# Patient Record
Sex: FEMALE | Race: WHITE | NOT HISPANIC OR LATINO | Employment: UNEMPLOYED | URBAN - METROPOLITAN AREA
[De-identification: names, ages, dates, MRNs, and addresses within clinical notes are randomized per-mention and may not be internally consistent; named-entity substitution may affect disease eponyms.]

---

## 2017-01-16 ENCOUNTER — HOSPITAL ENCOUNTER (OUTPATIENT)
Dept: OBGYN | Facility: HOSPITAL | Age: 32
Discharge: HOME | End: 2017-01-16
Attending: ADVANCED PRACTICE MIDWIFE | Admitting: OBSTETRICS & GYNECOLOGY

## 2017-01-16 ASSESSMENT — MIFFLIN-ST. JEOR: SCORE: 1468.81

## 2017-01-23 ENCOUNTER — AMBULATORY - HEALTHEAST (OUTPATIENT)
Dept: HEALTH INFORMATION MANAGEMENT | Facility: CLINIC | Age: 32
End: 2017-01-23

## 2017-01-27 ENCOUNTER — COMMUNICATION - HEALTHEAST (OUTPATIENT)
Dept: OBGYN | Facility: HOSPITAL | Age: 32
End: 2017-01-27

## 2017-01-27 ENCOUNTER — ANESTHESIA - HEALTHEAST (OUTPATIENT)
Dept: OBGYN | Facility: HOSPITAL | Age: 32
End: 2017-01-27

## 2017-01-31 ENCOUNTER — COMMUNICATION - HEALTHEAST (OUTPATIENT)
Dept: OBGYN | Facility: HOSPITAL | Age: 32
End: 2017-01-31

## 2018-08-02 LAB
ANTIBODY_EXT (HISTORICAL CONVERSION): NORMAL
HBSAG_EXT (HISTORICAL CONVERSION): NORMAL
HGB_EXT (HISTORICAL CONVERSION): 13.7
HIV_EXT: NORMAL
PLT_EXT - HISTORICAL: 391
RPR - HISTORICAL: NORMAL
RUBELLA_EXT (HISTORICAL CONVERSION): NORMAL

## 2018-12-06 ENCOUNTER — HOSPITAL ENCOUNTER (OUTPATIENT)
Dept: OBGYN | Facility: HOSPITAL | Age: 33
Discharge: HOME | End: 2018-12-06
Attending: ADVANCED PRACTICE MIDWIFE | Admitting: ADVANCED PRACTICE MIDWIFE

## 2018-12-06 LAB — FETAL RBC % LFV: 0 %

## 2018-12-06 ASSESSMENT — MIFFLIN-ST. JEOR: SCORE: 1388.07

## 2019-02-07 LAB — GBS_EXT (HISTORICAL CONVERSION): NEGATIVE

## 2019-02-14 ENCOUNTER — HOSPITAL ENCOUNTER (OUTPATIENT)
Dept: OBGYN | Facility: HOSPITAL | Age: 34
Discharge: HOME | End: 2019-02-15
Attending: ADVANCED PRACTICE MIDWIFE | Admitting: ADVANCED PRACTICE MIDWIFE

## 2019-02-14 ASSESSMENT — MIFFLIN-ST. JEOR: SCORE: 1465.19

## 2019-02-18 ENCOUNTER — ANESTHESIA - HEALTHEAST (OUTPATIENT)
Dept: OBGYN | Facility: HOSPITAL | Age: 34
End: 2019-02-18

## 2019-02-18 ASSESSMENT — MIFFLIN-ST. JEOR: SCORE: 1465.19

## 2019-02-19 ENCOUNTER — SURGERY - HEALTHEAST (OUTPATIENT)
Dept: SURGERY | Facility: HOSPITAL | Age: 34
End: 2019-02-19

## 2019-02-19 ENCOUNTER — ANESTHESIA - HEALTHEAST (OUTPATIENT)
Dept: SURGERY | Facility: HOSPITAL | Age: 34
End: 2019-02-19

## 2020-08-14 ENCOUNTER — HOSPITAL ENCOUNTER (EMERGENCY)
Facility: CLINIC | Age: 35
Discharge: HOME | End: 2020-08-14
Attending: EMERGENCY MEDICINE | Admitting: EMERGENCY MEDICINE
Payer: COMMERCIAL

## 2020-08-14 VITALS
RESPIRATION RATE: 16 BRPM | TEMPERATURE: 98.2 F | OXYGEN SATURATION: 99 % | WEIGHT: 137 LBS | SYSTOLIC BLOOD PRESSURE: 124 MMHG | HEART RATE: 83 BPM | DIASTOLIC BLOOD PRESSURE: 77 MMHG

## 2020-08-14 DIAGNOSIS — R42 DIZZY SPELLS: ICD-10-CM

## 2020-08-14 DIAGNOSIS — R20.2 PARESTHESIA: ICD-10-CM

## 2020-08-14 LAB
ALBUMIN SERPL-MCNC: 4 G/DL (ref 3.4–5)
ALBUMIN UR-MCNC: NEGATIVE MG/DL
ALP SERPL-CCNC: 70 U/L (ref 40–150)
ALT SERPL W P-5'-P-CCNC: 19 U/L (ref 0–50)
ANION GAP SERPL CALCULATED.3IONS-SCNC: 6 MMOL/L (ref 3–14)
APPEARANCE UR: CLEAR
AST SERPL W P-5'-P-CCNC: 15 U/L (ref 0–45)
BACTERIA #/AREA URNS HPF: ABNORMAL /HPF
BASOPHILS # BLD AUTO: 0.1 10E9/L (ref 0–0.2)
BASOPHILS NFR BLD AUTO: 0.9 %
BILIRUB SERPL-MCNC: 0.5 MG/DL (ref 0.2–1.3)
BILIRUB UR QL STRIP: NEGATIVE
BUN SERPL-MCNC: 8 MG/DL (ref 7–30)
CALCIUM SERPL-MCNC: 8.8 MG/DL (ref 8.5–10.1)
CHLORIDE SERPL-SCNC: 114 MMOL/L (ref 94–109)
CO2 SERPL-SCNC: 21 MMOL/L (ref 20–32)
COLOR UR AUTO: COLORLESS
CREAT SERPL-MCNC: 0.55 MG/DL (ref 0.52–1.04)
DIFFERENTIAL METHOD BLD: ABNORMAL
EOSINOPHIL # BLD AUTO: 0.1 10E9/L (ref 0–0.7)
EOSINOPHIL NFR BLD AUTO: 0.8 %
ERYTHROCYTE [DISTWIDTH] IN BLOOD BY AUTOMATED COUNT: 13.4 % (ref 10–15)
GFR SERPL CREATININE-BSD FRML MDRD: >90 ML/MIN/{1.73_M2}
GLUCOSE SERPL-MCNC: 115 MG/DL (ref 70–99)
GLUCOSE UR STRIP-MCNC: NEGATIVE MG/DL
HCT VFR BLD AUTO: 37.6 % (ref 35–47)
HGB BLD-MCNC: 12.1 G/DL (ref 11.7–15.7)
HGB UR QL STRIP: NEGATIVE
IMM GRANULOCYTES # BLD: 0 10E9/L (ref 0–0.4)
IMM GRANULOCYTES NFR BLD: 0.3 %
KETONES UR STRIP-MCNC: NEGATIVE MG/DL
LEUKOCYTE ESTERASE UR QL STRIP: NEGATIVE
LYMPHOCYTES # BLD AUTO: 2 10E9/L (ref 0.8–5.3)
LYMPHOCYTES NFR BLD AUTO: 18.2 %
MCH RBC QN AUTO: 28.8 PG (ref 26.5–33)
MCHC RBC AUTO-ENTMCNC: 32.2 G/DL (ref 31.5–36.5)
MCV RBC AUTO: 90 FL (ref 78–100)
MONOCYTES # BLD AUTO: 0.7 10E9/L (ref 0–1.3)
MONOCYTES NFR BLD AUTO: 6.3 %
NEUTROPHILS # BLD AUTO: 8.1 10E9/L (ref 1.6–8.3)
NEUTROPHILS NFR BLD AUTO: 73.5 %
NITRATE UR QL: NEGATIVE
NRBC # BLD AUTO: 0 10*3/UL
NRBC BLD AUTO-RTO: 0 /100
PH UR STRIP: 6 PH (ref 5–7)
PLATELET # BLD AUTO: 354 10E9/L (ref 150–450)
POTASSIUM SERPL-SCNC: 3.4 MMOL/L (ref 3.4–5.3)
PROT SERPL-MCNC: 7.4 G/DL (ref 6.8–8.8)
RBC # BLD AUTO: 4.2 10E12/L (ref 3.8–5.2)
RBC #/AREA URNS AUTO: <1 /HPF (ref 0–2)
SODIUM SERPL-SCNC: 141 MMOL/L (ref 133–144)
SOURCE: ABNORMAL
SP GR UR STRIP: 1 (ref 1–1.03)
UROBILINOGEN UR STRIP-MCNC: 0 MG/DL (ref 0–2)
WBC # BLD AUTO: 11.1 10E9/L (ref 4–11)
WBC #/AREA URNS AUTO: <1 /HPF (ref 0–5)

## 2020-08-14 PROCEDURE — 99285 EMERGENCY DEPT VISIT HI MDM: CPT | Performed by: EMERGENCY MEDICINE

## 2020-08-14 PROCEDURE — 25000128 H RX IP 250 OP 636: Performed by: EMERGENCY MEDICINE

## 2020-08-14 PROCEDURE — 93005 ELECTROCARDIOGRAM TRACING: CPT | Performed by: EMERGENCY MEDICINE

## 2020-08-14 PROCEDURE — 81001 URINALYSIS AUTO W/SCOPE: CPT | Performed by: EMERGENCY MEDICINE

## 2020-08-14 PROCEDURE — 93010 ELECTROCARDIOGRAM REPORT: CPT | Mod: Z6 | Performed by: EMERGENCY MEDICINE

## 2020-08-14 PROCEDURE — 99285 EMERGENCY DEPT VISIT HI MDM: CPT | Mod: 25 | Performed by: EMERGENCY MEDICINE

## 2020-08-14 PROCEDURE — 85025 COMPLETE CBC W/AUTO DIFF WBC: CPT | Performed by: EMERGENCY MEDICINE

## 2020-08-14 PROCEDURE — 96375 TX/PRO/DX INJ NEW DRUG ADDON: CPT | Performed by: EMERGENCY MEDICINE

## 2020-08-14 PROCEDURE — 96374 THER/PROPH/DIAG INJ IV PUSH: CPT | Performed by: EMERGENCY MEDICINE

## 2020-08-14 PROCEDURE — 80053 COMPREHEN METABOLIC PANEL: CPT | Performed by: EMERGENCY MEDICINE

## 2020-08-14 RX ORDER — KETOROLAC TROMETHAMINE 15 MG/ML
15 INJECTION, SOLUTION INTRAMUSCULAR; INTRAVENOUS ONCE
Status: COMPLETED | OUTPATIENT
Start: 2020-08-14 | End: 2020-08-14

## 2020-08-14 RX ORDER — LORAZEPAM 2 MG/ML
0.5 INJECTION INTRAMUSCULAR ONCE
Status: COMPLETED | OUTPATIENT
Start: 2020-08-14 | End: 2020-08-14

## 2020-08-14 RX ADMIN — KETOROLAC TROMETHAMINE 15 MG: 15 INJECTION, SOLUTION INTRAMUSCULAR; INTRAVENOUS at 21:49

## 2020-08-14 RX ADMIN — LORAZEPAM 0.5 MG: 2 INJECTION INTRAMUSCULAR; INTRAVENOUS at 21:52

## 2020-08-14 ASSESSMENT — ENCOUNTER SYMPTOMS
RHINORRHEA: 0
NERVOUS/ANXIOUS: 1
DYSURIA: 0
SORE THROAT: 0
SLEEP DISTURBANCE: 1
VOMITING: 0
NUMBNESS: 0
UNEXPECTED WEIGHT CHANGE: 0
APPETITE CHANGE: 0
TROUBLE SWALLOWING: 0
FEVER: 0
HEADACHES: 0
NAUSEA: 1
ABDOMINAL PAIN: 0
WEAKNESS: 0
CHILLS: 0
FLANK PAIN: 0
WHEEZING: 0
FATIGUE: 1
BACK PAIN: 0
SHORTNESS OF BREATH: 1
PALPITATIONS: 0
LIGHT-HEADEDNESS: 0
VOICE CHANGE: 0
PHOTOPHOBIA: 0
DYSPHORIC MOOD: 1
SINUS PRESSURE: 0

## 2020-08-14 NOTE — ED AVS SNAPSHOT
[facility]  [address] [address]  Phone:  [phone]  Fax:  [phone]                                    Elizabeth Kelly   MRN: [id number]    Department:  [facility]   Date of Visit:  8/14/2020           After Visit Summary Signature Page    I have received my discharge instructions, and my questions have been answered. I have discussed any challenges I see with this plan with the nurse or doctor.    ..........................................................................................................................................  Patient/Patient Representative Signature      ..........................................................................................................................................  Patient Representative Print Name and Relationship to Patient    ..................................................               ................................................  Date                                   Time    ..........................................................................................................................................  Reviewed by Signature/Title    ...................................................              ..............................................  Date                                               Time          22EPIC Rev 08/18

## 2020-08-15 ENCOUNTER — HOSPITAL ENCOUNTER (EMERGENCY)
Facility: CLINIC | Age: 35
Discharge: HOME | End: 2020-08-15
Attending: NURSE PRACTITIONER | Admitting: NURSE PRACTITIONER
Payer: COMMERCIAL

## 2020-08-15 VITALS
HEIGHT: 66 IN | SYSTOLIC BLOOD PRESSURE: 126 MMHG | HEART RATE: 69 BPM | BODY MASS INDEX: 22.02 KG/M2 | TEMPERATURE: 97.1 F | WEIGHT: 137 LBS | RESPIRATION RATE: 14 BRPM | OXYGEN SATURATION: 100 % | DIASTOLIC BLOOD PRESSURE: 64 MMHG

## 2020-08-15 DIAGNOSIS — R07.89 CHEST PAIN, NON-CARDIAC: ICD-10-CM

## 2020-08-15 DIAGNOSIS — F41.8 DEPRESSION WITH ANXIETY: ICD-10-CM

## 2020-08-15 DIAGNOSIS — R20.2 PARESTHESIAS: ICD-10-CM

## 2020-08-15 LAB
ANION GAP SERPL CALCULATED.3IONS-SCNC: 9 MMOL/L (ref 3–14)
BASOPHILS # BLD AUTO: 0.1 10E9/L (ref 0–0.2)
BASOPHILS NFR BLD AUTO: 1 %
BUN SERPL-MCNC: 7 MG/DL (ref 7–30)
CALCIUM SERPL-MCNC: 8.9 MG/DL (ref 8.5–10.1)
CHLORIDE SERPL-SCNC: 109 MMOL/L (ref 94–109)
CO2 SERPL-SCNC: 21 MMOL/L (ref 20–32)
CREAT SERPL-MCNC: 0.64 MG/DL (ref 0.52–1.04)
DIFFERENTIAL METHOD BLD: NORMAL
EOSINOPHIL # BLD AUTO: 0.1 10E9/L (ref 0–0.7)
EOSINOPHIL NFR BLD AUTO: 1.1 %
ERYTHROCYTE [DISTWIDTH] IN BLOOD BY AUTOMATED COUNT: 13.4 % (ref 10–15)
GFR SERPL CREATININE-BSD FRML MDRD: >90 ML/MIN/{1.73_M2}
GLUCOSE SERPL-MCNC: 95 MG/DL (ref 70–99)
HCT VFR BLD AUTO: 41.3 % (ref 35–47)
HGB BLD-MCNC: 13.3 G/DL (ref 11.7–15.7)
IMM GRANULOCYTES # BLD: 0 10E9/L (ref 0–0.4)
IMM GRANULOCYTES NFR BLD: 0.5 %
LYMPHOCYTES # BLD AUTO: 1.6 10E9/L (ref 0.8–5.3)
LYMPHOCYTES NFR BLD AUTO: 19.7 %
MCH RBC QN AUTO: 28.9 PG (ref 26.5–33)
MCHC RBC AUTO-ENTMCNC: 32.2 G/DL (ref 31.5–36.5)
MCV RBC AUTO: 90 FL (ref 78–100)
MONOCYTES # BLD AUTO: 0.6 10E9/L (ref 0–1.3)
MONOCYTES NFR BLD AUTO: 6.8 %
NEUTROPHILS # BLD AUTO: 5.8 10E9/L (ref 1.6–8.3)
NEUTROPHILS NFR BLD AUTO: 70.9 %
NRBC # BLD AUTO: 0 10*3/UL
NRBC BLD AUTO-RTO: 0 /100
PLATELET # BLD AUTO: 401 10E9/L (ref 150–450)
POTASSIUM SERPL-SCNC: 3.3 MMOL/L (ref 3.4–5.3)
RBC # BLD AUTO: 4.61 10E12/L (ref 3.8–5.2)
SODIUM SERPL-SCNC: 139 MMOL/L (ref 133–144)
TROPONIN I SERPL-MCNC: <0.015 UG/L (ref 0–0.04)
TSH SERPL DL<=0.005 MIU/L-ACNC: 1.69 MU/L (ref 0.4–4)
WBC # BLD AUTO: 8.1 10E9/L (ref 4–11)

## 2020-08-15 PROCEDURE — 96375 TX/PRO/DX INJ NEW DRUG ADDON: CPT | Performed by: NURSE PRACTITIONER

## 2020-08-15 PROCEDURE — 93010 ELECTROCARDIOGRAM REPORT: CPT | Mod: Z6 | Performed by: NURSE PRACTITIONER

## 2020-08-15 PROCEDURE — 96374 THER/PROPH/DIAG INJ IV PUSH: CPT | Performed by: NURSE PRACTITIONER

## 2020-08-15 PROCEDURE — 25000128 H RX IP 250 OP 636: Performed by: NURSE PRACTITIONER

## 2020-08-15 PROCEDURE — 85025 COMPLETE CBC W/AUTO DIFF WBC: CPT | Performed by: NURSE PRACTITIONER

## 2020-08-15 PROCEDURE — 99285 EMERGENCY DEPT VISIT HI MDM: CPT | Mod: 25 | Performed by: NURSE PRACTITIONER

## 2020-08-15 PROCEDURE — 80048 BASIC METABOLIC PNL TOTAL CA: CPT | Performed by: NURSE PRACTITIONER

## 2020-08-15 PROCEDURE — 93005 ELECTROCARDIOGRAM TRACING: CPT | Performed by: NURSE PRACTITIONER

## 2020-08-15 PROCEDURE — 84484 ASSAY OF TROPONIN QUANT: CPT | Performed by: NURSE PRACTITIONER

## 2020-08-15 PROCEDURE — 99285 EMERGENCY DEPT VISIT HI MDM: CPT | Performed by: NURSE PRACTITIONER

## 2020-08-15 PROCEDURE — 84443 ASSAY THYROID STIM HORMONE: CPT | Performed by: NURSE PRACTITIONER

## 2020-08-15 RX ORDER — LORAZEPAM 0.5 MG/1
0.5 TABLET ORAL EVERY 6 HOURS PRN
Qty: 6 TABLET | Refills: 0 | Status: SHIPPED | OUTPATIENT
Start: 2020-08-15

## 2020-08-15 RX ORDER — KETOROLAC TROMETHAMINE 15 MG/ML
15 INJECTION, SOLUTION INTRAMUSCULAR; INTRAVENOUS ONCE
Status: COMPLETED | OUTPATIENT
Start: 2020-08-15 | End: 2020-08-15

## 2020-08-15 RX ORDER — LORAZEPAM 2 MG/ML
1 INJECTION INTRAMUSCULAR ONCE
Status: COMPLETED | OUTPATIENT
Start: 2020-08-15 | End: 2020-08-15

## 2020-08-15 RX ADMIN — LORAZEPAM 1 MG: 2 INJECTION INTRAMUSCULAR; INTRAVENOUS at 12:19

## 2020-08-15 RX ADMIN — KETOROLAC TROMETHAMINE 15 MG: 15 INJECTION, SOLUTION INTRAMUSCULAR; INTRAVENOUS at 12:19

## 2020-08-15 ASSESSMENT — MIFFLIN-ST. JEOR: SCORE: 1343.18

## 2020-08-15 NOTE — ED TRIAGE NOTES
Pt presents with sudden onset of dizziness and left arm numbness.  Pt has normal speech.  No change in strength in extremities- equal and strong.

## 2020-08-15 NOTE — ED NOTES
Bed: ED10  Expected date:   Expected time:   Means of arrival:   Comments:  Ambulance  35 year old  Arm numbness

## 2020-08-15 NOTE — ED NOTES
"Pt was seen last night in ED.  Pt was discharged from ER at 2230.    Pt went to bed.   Pt woke up feeling\"weird\"   Pt sat on floor playing with child.   Pt then had numbness in mostly left arm but went into both hands and a wave of coldness.   Pt continues to have numbness in left arm  And tightness in chest.    Denies n/v  "

## 2020-08-15 NOTE — ED PROVIDER NOTES
"  History     Chief Complaint   Patient presents with     Chest Pain     seen yesterday, diagnosed with anxiety. can feel it coming on.      HPI  Elizabeth Kelly is a 35 year old female with past medical history including depression (previously stable on sertraline and self discontinued in January 2020) who presents emergency department with chest pain episodes.  Patient reports onset of symptoms was initially yesterday and patient was seen in the emergency department last evening for this.  Patient was discharged last evening.  Patient states that she woke up this morning and, subsequently, at 10:15 am started experiencing episodes.  Patient states that her episodes feel like her heart is beating fast or hard, chest tightness and associative symptoms of tunnel vision, left arm tingling and numbness of left hand, arm, extending to shoulder, left hand feels cold, feeling of \"brain feeling foggy and not normal\", and feels like has to take deep breaths and concentrate on breathing.  Pt reports it is \"very scary and it feels like I am going to die\".  These episodes last a couple of minutes  Thinks she has had 6 episodes since 10:15 am    Pt states was seen last night for this in the Ed; prior to yesterday no previous symptoms/episodes like this    Pt states she laid down and this is helpful and patient reports the medication given in the ED yesterday was helpful temporarily until 10:15 am  Pt denies known aggravating factors  Pt admits to current stress and marital problems but states the marital problems are not serious.  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  Patient also reports recent death of an in-law in the family and reports this should not make her have \"panic attacks \".  Patient states she exercises regularly and eats healthy and does everything right and expressing frustration with these episodes.    Hx of IBS " "and yesterday experienced diarrhea with nausea and typically under good control  No rashes or tick bites recently  No exposure to COVID that is known    Patient denies fever, aches, chills, sweats, ear pain, eye pain, throat pain, cough, wheezing, shortness of breath, abdominal pain, vomiting, hematochezia, hematemesis, bright red rectal bleeding, hematuria, dysuria, speech difficulty, left or right-sided body weakness, mental confusion, thoughts of harming self.    Allergies:  Allergies   Allergen Reactions     Penicillins Rash       Problem List:    There are no active problems to display for this patient.       Past Medical History:    No past medical history on file.    Past Surgical History:    No past surgical history on file.    Family History:    No family history on file.    Social History:  Marital Status:   [2]  Social History     Tobacco Use     Smoking status: Not on file   Substance Use Topics     Alcohol use: Not on file     Drug use: Not on file        Medications:    LORazepam (ATIVAN) 0.5 MG tablet  sertraline (ZOLOFT) 50 MG tablet      Review of Systems  As mentioned above in the history present illness. All other systems were reviewed and are negative.    Physical Exam   BP: 133/61  Pulse: 90  Heart Rate: 76  Temp: 97.1  F (36.2  C)  Resp: 22  Height: 167.6 cm (5' 6\")  Weight: 62.1 kg (137 lb)  SpO2: 100 %      Physical Exam  Vitals signs and nursing note reviewed.   Constitutional:       General: She is in acute distress (anxious).      Appearance: She is well-developed and normal weight. She is not ill-appearing, toxic-appearing or diaphoretic.   HENT:      Head: Normocephalic and atraumatic.      Right Ear: External ear normal.      Left Ear: External ear normal.   Eyes:      General:         Right eye: No discharge.         Left eye: No discharge.      Extraocular Movements: Extraocular movements intact.      Conjunctiva/sclera: Conjunctivae normal.      Pupils: Pupils are equal, " round, and reactive to light.   Neck:      Musculoskeletal: Normal range of motion and neck supple.      Thyroid: No thyromegaly.   Cardiovascular:      Rate and Rhythm: Normal rate and regular rhythm.      Pulses:           Radial pulses are 2+ on the right side and 2+ on the left side.      Heart sounds: Normal heart sounds. No murmur. No friction rub.   Pulmonary:      Effort: Pulmonary effort is normal. No respiratory distress.      Breath sounds: Normal breath sounds. No stridor. No wheezing, rhonchi or rales.   Chest:      Chest wall: No tenderness.   Abdominal:      General: Bowel sounds are normal. There is no distension.      Tenderness: There is no abdominal tenderness. There is no right CVA tenderness, left CVA tenderness, guarding or rebound.   Lymphadenopathy:      Cervical: No cervical adenopathy.   Skin:     General: Skin is warm and dry.      Capillary Refill: Capillary refill takes less than 2 seconds.      Coloration: Skin is not pale.      Findings: No erythema or rash.   Neurological:      Mental Status: She is alert.   Psychiatric:         Attention and Perception: Attention normal.         Mood and Affect: Mood is anxious.         Speech: Speech normal.         Behavior: Behavior is cooperative.         Thought Content: Thought content normal.         Judgment: Judgment normal.         ED Course        Procedures         EKG Interpretation:      EKG Number: 1  Interpreted by RAMSES Mcintosh CNP, Samuel Jimenes MD  Symptoms at time of EKG: paraesthesia, chest pain   Rhythm: Normal sinus   Rate: Normal  Axis: Normal  Ectopy: None  Conduction: Normal  ST Segments/ T Waves: non specific T wave flattening V4, V5 and V6  Q Waves: None  Clinical Impression: no acute changes, non specific EKG      Results for orders placed or performed during the hospital encounter of 08/15/20 (from the past 24 hour(s))   CBC with platelets differential   Result Value Ref Range    WBC 8.1 4.0 - 11.0 10e9/L     RBC Count 4.61 3.8 - 5.2 10e12/L    Hemoglobin 13.3 11.7 - 15.7 g/dL    Hematocrit 41.3 35.0 - 47.0 %    MCV 90 78 - 100 fl    MCH 28.9 26.5 - 33.0 pg    MCHC 32.2 31.5 - 36.5 g/dL    RDW 13.4 10.0 - 15.0 %    Platelet Count 401 150 - 450 10e9/L    Diff Method Automated Method     % Neutrophils 70.9 %    % Lymphocytes 19.7 %    % Monocytes 6.8 %    % Eosinophils 1.1 %    % Basophils 1.0 %    % Immature Granulocytes 0.5 %    Nucleated RBCs 0 0 /100    Absolute Neutrophil 5.8 1.6 - 8.3 10e9/L    Absolute Lymphocytes 1.6 0.8 - 5.3 10e9/L    Absolute Monocytes 0.6 0.0 - 1.3 10e9/L    Absolute Eosinophils 0.1 0.0 - 0.7 10e9/L    Absolute Basophils 0.1 0.0 - 0.2 10e9/L    Abs Immature Granulocytes 0.0 0 - 0.4 10e9/L    Absolute Nucleated RBC 0.0    Basic metabolic panel   Result Value Ref Range    Sodium 139 133 - 144 mmol/L    Potassium 3.3 (L) 3.4 - 5.3 mmol/L    Chloride 109 94 - 109 mmol/L    Carbon Dioxide 21 20 - 32 mmol/L    Anion Gap 9 3 - 14 mmol/L    Glucose 95 70 - 99 mg/dL    Urea Nitrogen 7 7 - 30 mg/dL    Creatinine 0.64 0.52 - 1.04 mg/dL    GFR Estimate >90 >60 mL/min/[1.73_m2]    GFR Estimate If Black >90 >60 mL/min/[1.73_m2]    Calcium 8.9 8.5 - 10.1 mg/dL   Troponin I   Result Value Ref Range    Troponin I ES <0.015 0.000 - 0.045 ug/L   TSH with free T4 reflex   Result Value Ref Range    TSH 1.69 0.40 - 4.00 mU/L       Medications   LORazepam (ATIVAN) injection 1 mg (1 mg Intravenous Given 8/15/20 1219)   ketorolac (TORADOL) injection 15 mg (15 mg Intravenous Given 8/15/20 1219)       Assessments & Plan (with Medical Decision Making)  Elizabeth Kelly is a 35 year old female with past medical history including depression (previously stable on sertraline and self discontinued in January 2020) who presents emergency department with chest pain episodes.  Patient seen approximately 12 hours previously in the emergency department for similar concerns.  Work-up included EKG and labs and treatment included Ativan  and Toradol.  Patient reporting previous treatments were helpful and temporarily resolving her symptoms.  On exam today patient appears anxious repeat EKG reveals nonspecific T wave flattening and V4 5 and no acute ischemic changes.  Troponin is negative for any acute ischemia.  TSH obtained and is normal no sign of thyroid storm.  CBC completed no sign of acute leukocytosis or acute anemia or blood loss.  Comprehensive metabolic panel completed and normal liver and renal function with no sign of electrolyte abnormality to be cause for current symptoms.  Discussed all of this with patient.  Patient given Toradol again and also given Ativan 1 mg.  Patient states it completely resolved her symptoms.  Spent 20 minutes of face-to-face time counseling patient and discussing the possibility of anxiety/depression for current symptoms and panic attacks versus a cardiology etiology.  Gave patient 6 tablets of Ativan to trial at home and also prescribed sertraline.  Advised patient that she needed a follow-up visit within 2 to 3 weeks to reassess.  Discussed that if she should develop suicidal thoughts or plans to return to the emergency room immediately for reevaluation.  Discussed the importance of counseling.  Patient verbalized understanding regarding all of the above and was discharged in stable condition.     I have reviewed the nursing notes.    I have reviewed the findings, diagnosis, plan and need for follow up with the patient.    Discharge Medication List as of 8/15/2020  2:23 PM      START taking these medications    Details   LORazepam (ATIVAN) 0.5 MG tablet Take 1 tablet (0.5 mg) by mouth every 6 hours as needed for anxiety, Disp-6 tablet,R-0, E-Prescribe             Final diagnoses:   Paresthesias   Chest pain, non-cardiac   Depression with anxiety       8/15/2020   [facility] EMERGENCY DEPARTMENT     Melody Sanchez APRN CNP  08/16/20 [id number]

## 2020-08-15 NOTE — ED AVS SNAPSHOT
[facility] Emergency Department  [address] [address]  Phone:  [phone]  Fax:  [phone]                                    Elizabeth Kelly   MRN: [id number]    Department:  [facility] Emergency Department   Date of Visit:  8/15/2020           After Visit Summary Signature Page    I have received my discharge instructions, and my questions have been answered. I have discussed any challenges I see with this plan with the nurse or doctor.    ..........................................................................................................................................  Patient/Patient Representative Signature      ..........................................................................................................................................  Patient Representative Print Name and Relationship to Patient    ..................................................               ................................................  Date                                   Time    ..........................................................................................................................................  Reviewed by Signature/Title    ...................................................              ..............................................  Date                                               Time          22EPIC Rev 08/18

## 2020-08-15 NOTE — DISCHARGE INSTRUCTIONS
Start the sertraline today.  I would recommend taking it either every morning or every night.  This takes approximately 1 to 4 weeks to take effect.  In the meantime if you should develop your symptoms again you may take 1 Ativan and if this helps decrease your symptoms then you can be reassured of it being more anxiety related than cardiac related.  Please schedule a follow-up visit in 7 to 10 to 14 days to recheck your response to sertraline.  Return to the emergency room if you should develop suicidal thoughts or plans or if you have worsening anxiety.

## 2020-08-15 NOTE — ED PROVIDER NOTES
"  History     Chief Complaint   Patient presents with     Dizziness     worse with position changes     Numbness     Left arm     HPI  Elizabeth Kelly is a 35 year old female with history of depression and irritable bowel syndrome presenting for evaluation of left arm numbness and near syncopal episode.  Patient reports that around 7 PM tonight she began to feel some slight numbness and tingling starting in her left hand and radiating upwards to involve her entire left arm and shoulder into her neck.  Patient denies any neck pain or other pain in her arm or chest.  Patient reports she initially ignored the symptoms and carried on with her normal activities including putting her daughter to bed.  After coming out of her daughter's room however, she started to feel lightheaded and \"got tunnel vision\".  Patient states she felt very scared and had to think about taking a breath and \"I felt like I was going to die\".  Patient lay down and put her feet up in the air and asked her  to call 911 because she felt so scared about her symptoms.  Patient reports her lightheadedness rapidly improved after laying down and she never lost consciousness.  She denies having any palpitations or chest pain but does report her heart \"felt cold\" for a brief period.  Patient also thinks she may have felt clammy for a brief period but this also has resolved.  In the ED patient reports only slight residual tingling in her left arm as well as stiffness in her neck.  Patient reports overall she is been feeling well recently.  Denies fever or chills.  Denies cough.  Denies any other infectious symptoms.  Patient does report she has been under a fair amount of stress and has been having difficulties with her marriage recently, especially over the past week.  Patient reports that her  continues to work and her kids are all healthy but she does have kids ranging from 18months to 9 years at home whom she has been taking care of.  Patient " "reports she has been off her sertraline for depression since the end of last year.  She reports her mood has been \"okay\" but she admits to feeling more down recently due to her relationship issues.  She reports some difficulty falling asleep and regularly wakes up feeling unrested.  She reports usually getting 6 to 7 hours of sleep at night.  Denies any previous history of panic attacks but does report family history of anxiety and panic disorder.  Patient denies any history of cardiac disease.  Reports she is routinely active without any cardiac symptoms or lightheadedness.  Patient reports she mowed the lawn today and felt fine.  She also reports that most days she does HIIT exercise routines and never has any symptoms of chest pain or lightheadedness and or difficulty breathing with these exercise intervals.    Allergies:  Allergies   Allergen Reactions     Penicillins Rash       Problem List:    There are no active problems to display for this patient.       Past Medical History:    No past medical history on file.    Past Surgical History:    No past surgical history on file.    Family History:    No family history on file.    Social History:  Marital Status:    Social History     Tobacco Use     Smoking status: Not on file   Substance Use Topics     Alcohol use: Not on file     Drug use: Not on file        Medications:    No current outpatient medications on file.        Review of Systems   Constitutional: Positive for fatigue (tonight). Negative for appetite change, chills, fever and unexpected weight change.   HENT: Negative for congestion, ear pain, mouth sores, postnasal drip, rhinorrhea, sinus pressure, sore throat, trouble swallowing and voice change.    Eyes: Negative for photophobia and visual disturbance.   Respiratory: Positive for shortness of breath (at time of event, resolved). Negative for wheezing.    Cardiovascular: Negative for chest pain, palpitations and leg swelling.   Gastrointestinal: " Positive for nausea (at time of event, resolved). Negative for abdominal pain and vomiting.   Genitourinary: Negative for dysuria, flank pain and vaginal bleeding (LMP 2.5 wk ago - has tubes tied).   Musculoskeletal: Negative for back pain.   Skin: Negative for rash.   Neurological: Negative for weakness, light-headedness, numbness and headaches.   Psychiatric/Behavioral: Positive for dysphoric mood and sleep disturbance. Negative for self-injury and suicidal ideas. The patient is nervous/anxious.    All other systems reviewed and are negative.      Physical Exam   BP: (!) 140/83  Pulse: 81  Heart Rate: 91  Temp: 98.2  F (36.8  C)  Resp: 16  Weight: 62.1 kg (137 lb)  SpO2: 100 %      Physical Exam  Vitals signs and nursing note reviewed.   Constitutional:       Appearance: Normal appearance. She is not ill-appearing or diaphoretic.   HENT:      Head: Normocephalic and atraumatic.      Nose: Nose normal.      Mouth/Throat:      Mouth: Mucous membranes are moist.   Eyes:      Extraocular Movements: Extraocular movements intact.      Conjunctiva/sclera: Conjunctivae normal.      Pupils: Pupils are equal, round, and reactive to light.   Neck:      Musculoskeletal: Normal range of motion and neck supple. No neck rigidity.      Comments: Some left-sided muscular tenderness of the paraspinal cervical musculature as well as trapezius muscle.  No change in her tingling sensation with movement of the neck  Cardiovascular:      Rate and Rhythm: Normal rate and regular rhythm.      Pulses: Normal pulses.      Heart sounds: Normal heart sounds. No murmur.   Pulmonary:      Effort: Pulmonary effort is normal.      Breath sounds: Normal breath sounds. No wheezing or rhonchi.   Abdominal:      General: Abdomen is flat.      Palpations: Abdomen is soft.      Tenderness: There is no abdominal tenderness.   Musculoskeletal: Normal range of motion.         General: No tenderness.   Skin:     General: Skin is warm and dry.       Capillary Refill: Capillary refill takes less than 2 seconds.   Neurological:      Mental Status: She is alert and oriented to person, place, and time.      GCS: GCS eye subscore is 4. GCS verbal subscore is 5. GCS motor subscore is 6.      Cranial Nerves: No cranial nerve deficit.      Sensory: Sensory deficit (Slight decrease sensation to the left arm involving all fingers and circumferentially up to the shoulder) present.      Motor: No weakness.      Coordination: Coordination normal.      Gait: Gait normal.   Psychiatric:         Attention and Perception: Attention normal.         Mood and Affect: Mood is anxious.         Speech: Speech normal.         Behavior: Behavior is cooperative.         Thought Content: Thought content does not include suicidal ideation. Thought content does not include suicidal plan.         Cognition and Memory: Cognition normal.         Judgment: Judgment normal.      Comments: Mildly anxious in the ED         ED Course        Procedures               EKG Interpretation:      Interpreted by Van Belle MD  Time reviewed: 2155  Symptoms at time of EKG: left arm numbness, near syncope    Rhythm: normal sinus   Rate: normal  Axis: normal  Ectopy: none  Conduction: normal  ST Segments/ T Waves: No ST-T wave changes  Q Waves: none  Comparison to prior: No old EKG available    Clinical Impression: normal EKG                 Results for orders placed or performed during the hospital encounter of 08/14/20 (from the past 24 hour(s))   CBC with platelets differential   Result Value Ref Range    WBC 11.1 (H) 4.0 - 11.0 10e9/L    RBC Count 4.20 3.8 - 5.2 10e12/L    Hemoglobin 12.1 11.7 - 15.7 g/dL    Hematocrit 37.6 35.0 - 47.0 %    MCV 90 78 - 100 fl    MCH 28.8 26.5 - 33.0 pg    MCHC 32.2 31.5 - 36.5 g/dL    RDW 13.4 10.0 - 15.0 %    Platelet Count 354 150 - 450 10e9/L    Diff Method Automated Method     % Neutrophils 73.5 %    % Lymphocytes 18.2 %    % Monocytes 6.3 %    %  Eosinophils 0.8 %    % Basophils 0.9 %    % Immature Granulocytes 0.3 %    Nucleated RBCs 0 0 /100    Absolute Neutrophil 8.1 1.6 - 8.3 10e9/L    Absolute Lymphocytes 2.0 0.8 - 5.3 10e9/L    Absolute Monocytes 0.7 0.0 - 1.3 10e9/L    Absolute Eosinophils 0.1 0.0 - 0.7 10e9/L    Absolute Basophils 0.1 0.0 - 0.2 10e9/L    Abs Immature Granulocytes 0.0 0 - 0.4 10e9/L    Absolute Nucleated RBC 0.0    Comprehensive metabolic panel   Result Value Ref Range    Sodium 141 133 - 144 mmol/L    Potassium 3.4 3.4 - 5.3 mmol/L    Chloride 114 (H) 94 - 109 mmol/L    Carbon Dioxide 21 20 - 32 mmol/L    Anion Gap 6 3 - 14 mmol/L    Glucose 115 (H) 70 - 99 mg/dL    Urea Nitrogen 8 7 - 30 mg/dL    Creatinine 0.55 0.52 - 1.04 mg/dL    GFR Estimate >90 >60 mL/min/[1.73_m2]    GFR Estimate If Black >90 >60 mL/min/[1.73_m2]    Calcium 8.8 8.5 - 10.1 mg/dL    Bilirubin Total 0.5 0.2 - 1.3 mg/dL    Albumin 4.0 3.4 - 5.0 g/dL    Protein Total 7.4 6.8 - 8.8 g/dL    Alkaline Phosphatase 70 40 - 150 U/L    ALT 19 0 - 50 U/L    AST 15 0 - 45 U/L   UA reflex to Microscopic   Result Value Ref Range    Color Urine Colorless     Appearance Urine Clear     Glucose Urine Negative NEG^Negative mg/dL    Bilirubin Urine Negative NEG^Negative    Ketones Urine Negative NEG^Negative mg/dL    Specific Gravity Urine 1.001 (L) 1.003 - 1.035    Blood Urine Negative NEG^Negative    pH Urine 6.0 5.0 - 7.0 pH    Protein Albumin Urine Negative NEG^Negative mg/dL    Urobilinogen mg/dL 0.0 0.0 - 2.0 mg/dL    Nitrite Urine Negative NEG^Negative    Leukocyte Esterase Urine Negative NEG^Negative    Source Midstream Urine     RBC Urine <1 0 - 2 /HPF    WBC Urine <1 0 - 5 /HPF    Bacteria Urine Few (A) NEG^Negative /HPF       Medications   LORazepam (ATIVAN) injection 0.5 mg (0.5 mg Intravenous Given 8/14/20 9532)   ketorolac (TORADOL) injection 15 mg (15 mg Intravenous Given 8/14/20 7598)     10:26 PM Patient re-assessed: Patient reports feeling much better.  No longer  has any abnormal sensation in her heart or chest.  Still reports very slight tingling in her hand and arm however this also has improved.  Reviewed reassuring labs and discuss further regarding probable anxiety diagnosis.  Recommended close primary care follow-up to discuss restarting her medications as well as to reach out to a counselor she is previously seen to discuss personal as well as marriage counseling.  Also advised that if she does have recurrent symptoms to consider coming back for repeat evaluation if her symptoms do not rapidly resolve.      Assessments & Plan (with Medical Decision Making)  35-year-old female with with history of depression and-year-old bowel syndrome presenting for evaluation of left arm numbness and a near syncopal episode tonight.  Symptoms relatively spontaneous and unprovoked without a clear precipitating factor.  No other neurologic symptoms including no weakness, blurry vision, headache, difficulty with speech or swallowing, nor difficulty with balance or coordination.  Symptoms improving in the ED but still present.  Neurologic exam negative with exception of decreased sensation in her left arm.  She does have some neck pain as well which may be contributing to her symptoms but her numbness does not change with movement of the neck and she has no associated pain.  Patient has been under a lot of stress in her symptoms description highly suggestive of a panic attack.  Patient does have a strong family history for anxiety and panic disorder and given her current relationship stress, this is thought to be a probable diagnosis.  Screening EKG reassuringly normal as were her labs and urine.  Patient is frequently active with exercise and has never had similar symptoms with exercise.  She has no heart murmur or clinical evidence to suggest CHF such as edema or rales.  Clinically unlikely to represent a cardiac cause of her syncope given her normal exercise tolerance without any  symptoms and the abrupt nature of her symptoms tonight associated with known increased psychosocial stressors.  Patient was treated with lorazepam and Toradol for her anxiety and neck muscle tension with notable improvement in her symptoms.  She did however still have some mild residual tingling sensation in her left arm without weakness.  I suspect her main episode tonight was a panic attack although the residual slight tingling in her arm may be from a cervical radiculopathy or similar.  Given the absence of weakness or other objective signs of neurologic deficit, no indication for neurologic imaging at this time.  If symptoms persist, may benefit from a cervical MRI.  Highly doubt a stroke based on her symptoms.  Recommended close primary care follow-up this week.  Return precautions given if she develops any new or recurrent concerning symptoms.     I have reviewed the nursing notes.    I have reviewed the findings, diagnosis, plan and need for follow up with the patient.       There are no discharge medications for this patient.      Final diagnoses:   Dizzy spells - Probable anxiety reaction/panic attack   Paresthesia - left arm       8/14/2020   [facility] EMERGENCY DEPARTMENT     Belle, Van Richard MD  08/15/20 8174

## 2021-05-12 ENCOUNTER — HOSPITAL ENCOUNTER (EMERGENCY)
Facility: CLINIC | Age: 36
Discharge: HOME | End: 2021-05-12
Attending: EMERGENCY MEDICINE | Admitting: EMERGENCY MEDICINE
Payer: MEDICAID

## 2021-05-12 VITALS
RESPIRATION RATE: 20 BRPM | OXYGEN SATURATION: 98 % | HEART RATE: 113 BPM | DIASTOLIC BLOOD PRESSURE: 85 MMHG | HEIGHT: 66 IN | BODY MASS INDEX: 22.5 KG/M2 | WEIGHT: 140 LBS | SYSTOLIC BLOOD PRESSURE: 143 MMHG | TEMPERATURE: 99.4 F

## 2021-05-12 DIAGNOSIS — F43.21 GRIEF REACTION: ICD-10-CM

## 2021-05-12 DIAGNOSIS — F41.0 PANIC ATTACK: ICD-10-CM

## 2021-05-12 PROCEDURE — 99284 EMERGENCY DEPT VISIT MOD MDM: CPT | Performed by: EMERGENCY MEDICINE

## 2021-05-12 PROCEDURE — 250N000013 HC RX MED GY IP 250 OP 250 PS 637: Performed by: EMERGENCY MEDICINE

## 2021-05-12 PROCEDURE — 99283 EMERGENCY DEPT VISIT LOW MDM: CPT | Performed by: EMERGENCY MEDICINE

## 2021-05-12 RX ORDER — LORAZEPAM 0.5 MG/1
0.5 TABLET ORAL ONCE
Status: COMPLETED | OUTPATIENT
Start: 2021-05-12 | End: 2021-05-12

## 2021-05-12 RX ADMIN — LORAZEPAM 0.5 MG: 0.5 TABLET ORAL at 04:19

## 2021-05-12 ASSESSMENT — ENCOUNTER SYMPTOMS
DYSPHORIC MOOD: 1
CONFUSION: 0
WEAKNESS: 0
APPETITE CHANGE: 1
LIGHT-HEADEDNESS: 0
NUMBNESS: 1
ABDOMINAL PAIN: 0
HEADACHES: 0
CHEST TIGHTNESS: 0
NERVOUS/ANXIOUS: 1
NECK STIFFNESS: 0
NECK PAIN: 0
VOMITING: 0
NAUSEA: 0
SHORTNESS OF BREATH: 0
CHILLS: 0
HALLUCINATIONS: 0
SLEEP DISTURBANCE: 1
FATIGUE: 1
WOUND: 0
FEVER: 0
DECREASED CONCENTRATION: 1
COUGH: 0
CHOKING: 0

## 2021-05-12 ASSESSMENT — MIFFLIN-ST. JEOR: SCORE: 1351.79

## 2021-05-12 NOTE — ED NOTES
Pt feeling improved, more calm and has been able to doze off. MD at bedside. Pt has called ride for planned discharge.

## 2021-05-12 NOTE — ED TRIAGE NOTES
Pt lost  to accident a few months ago. Pt reports difficulty managing stress/thoughts of fear and panic that something bad may happen to her or her children. Pt is taking medications daily (has been on them for some time). Pt also sees councelor--due to see them later today. Pt states she started to feel panicked around 2300 and that she couldn't get her sx under control--feels that her heart is racing. Pt neighbor currently with children. Pt reports she does have strong support with friends and [employer].

## 2021-05-12 NOTE — ED PROVIDER NOTES
History     Chief Complaint   Patient presents with     Panic Attack     onset around 2300. feels cold rush, hand numbness, shoulder/chest pain. hard to breathe. same as last panic attack. recently lost .      HPI  Elizabeth Kelly is a 36 year old female presenting for evaluation of severe depression and an anxiety attack.  Patient reports that her  was killed in a car accident in March.  Since then she has been struggling with depression and anxiety regarding herself and her children.  She has 3 children aged 2, 4, 10.  She has been struggling with helping them to cope with the loss of her father along with her own difficulty with loss.  She has been in counseling and was started on medication for anxiety and depression.  She denies any suicidal thoughts.  She does report significant fear and anxiety about her potential death as well as fear about a significant injury or death of one of her children.  Tonight she began having recurrent thoughts about this and found herself hyperventilating and becoming tingling in her hands.  She has been prescribed lorazepam by her primary care provider but did not take any because she is concerned about addiction.  She eventually called a neighbor who brought her in for further evaluation.  Patient denies any infectious symptoms.  Otherwise patient reports physically feeling okay.      Allergies:  Allergies   Allergen Reactions     Penicillins Rash       Problem List:    There are no active problems to display for this patient.       Past Medical History:    No past medical history on file.    Past Surgical History:    No past surgical history on file.    Family History:    No family history on file.    Social History:  Marital Status:   [5]  Social History     Tobacco Use     Smoking status: Not on file   Substance Use Topics     Alcohol use: Not on file     Drug use: Not on file        Medications:    LORazepam (ATIVAN) 0.5 MG tablet  sertraline (ZOLOFT)  "50 MG tablet          Review of Systems   Constitutional: Positive for appetite change and fatigue. Negative for chills and fever.   HENT: Negative for congestion.    Eyes: Negative for visual disturbance.   Respiratory: Negative for cough, choking, chest tightness and shortness of breath.    Cardiovascular: Negative for chest pain.   Gastrointestinal: Negative for abdominal pain, nausea and vomiting.   Genitourinary: Negative for decreased urine volume.   Musculoskeletal: Negative for neck pain and neck stiffness.   Skin: Negative for wound.   Neurological: Positive for numbness (Transient tingling in hands, resolved). Negative for weakness, light-headedness and headaches.   Psychiatric/Behavioral: Positive for decreased concentration, dysphoric mood and sleep disturbance. Negative for confusion, hallucinations, self-injury and suicidal ideas. The patient is nervous/anxious.    All other systems reviewed and are negative.      Physical Exam   BP: 126/86  Pulse: 124  Temp: 99.4  F (37.4  C)  Resp: 20  Height: 167.6 cm (5' 6\")  Weight: 63.5 kg (140 lb)  SpO2: 99 %      Physical Exam  Vitals signs and nursing note reviewed.   Constitutional:       Comments: Tearful, depressed appearing, alert and communicative   HENT:      Head: Atraumatic.      Nose: Nose normal.      Mouth/Throat:      Mouth: Mucous membranes are moist.   Eyes:      Conjunctiva/sclera: Conjunctivae normal.   Neck:      Musculoskeletal: Normal range of motion.   Cardiovascular:      Rate and Rhythm: Normal rate.      Pulses: Normal pulses.   Pulmonary:      Effort: Pulmonary effort is normal.   Musculoskeletal: Normal range of motion.   Skin:     General: Skin is warm and dry.      Capillary Refill: Capillary refill takes less than 2 seconds.   Neurological:      Mental Status: She is alert and oriented to person, place, and time.   Psychiatric:         Attention and Perception: Attention normal.         Mood and Affect: Mood is depressed.         " Speech: Speech normal.         Behavior: Behavior is cooperative.         Thought Content: Thought content does not include homicidal or suicidal ideation. Thought content does not include homicidal or suicidal plan.         Cognition and Memory: Cognition normal.         Judgment: Judgment normal.         ED Course        Procedures                   No results found for this or any previous visit (from the past 24 hour(s)).    Medications   LORazepam (ATIVAN) tablet 0.5 mg (0.5 mg Oral Given 21 0419)     5:16 AM Patient re-assessed: Resting much more comfortably.  Breathing improved.  Patient reports she has been able to relax.  She was practicing with the breathing technique and took the lorazepam.  Patient again counseled regarding the benefits of occasional use of lorazepam for her anxiety.    Assessments & Plan (with Medical Decision Making)  36-year-old female presenting for evaluation of a panic attack.  Her   suddenly in a car accident 2 months ago and patient has been struggling with depression anxiety and caring for her 3 young children ever since.  Had a panic attack today and was unable to control her symptoms so had a friend bring her in for evaluation.  Patient was counseled and able to be talked down.  Was given lorazepam for further symptom control.  No acute medical issue appears to be going on.  Patient felt much better after lorazepam and breathing exercises.  Patient denies any suicidal or homicidal thoughts.  Patient does have a counselor which she follows with and has appointment today for follow-up.  Also has a primary care provider who is providing medication management for her.  Patient feels comfortable going back home and friend came to pick her up.     I have reviewed the nursing notes.    I have reviewed the findings, diagnosis, plan and need for follow up with the patient.       New Prescriptions    No medications on file       Final diagnoses:   Panic attack   Grief  reaction       5/12/2021   [facility] EMERGENCY DEPT     Belle, Van Richard MD  05/12/21 0526

## 2021-05-30 VITALS — HEIGHT: 66 IN | WEIGHT: 165.8 LBS | BODY MASS INDEX: 26.65 KG/M2

## 2021-06-02 VITALS — HEIGHT: 66 IN | WEIGHT: 165 LBS | BODY MASS INDEX: 26.52 KG/M2

## 2021-06-02 VITALS — HEIGHT: 66 IN | BODY MASS INDEX: 23.78 KG/M2 | WEIGHT: 148 LBS

## 2021-06-02 VITALS — WEIGHT: 165 LBS | BODY MASS INDEX: 26.52 KG/M2 | HEIGHT: 66 IN

## 2021-06-08 NOTE — PROGRESS NOTES
1032- [facility] updated on patient arrival to unit, prenatal history, and patient c/o contractions. Also updated on contraction pattern and strength, SVE results, and FHTs category I and reactive. See new orders.

## 2021-06-08 NOTE — PROGRESS NOTES
11:15- Discharge instructions given to patient. Patient verbalizes understanding of discharge instructions.

## 2021-06-08 NOTE — ANESTHESIA POSTPROCEDURE EVALUATION
Patient: Elizabeth Kelly  * No procedures listed *  Anesthesia type: regional    Patient location: Labor and Delivery  Last vitals:   Vitals:    01/28/17 0800   BP: 109/66   Pulse: 76   Resp: 16   Temp: 36.5  C (97.7  F)   SpO2:      Post vital signs: stable  Level of consciousness: awake and responds to simple questions  Post-anesthesia pain: pain controlled  Post-anesthesia nausea and vomiting: no  Pulmonary: unassisted, return to baseline  Cardiovascular: stable and blood pressure at baseline  Hydration: adequate  Anesthetic events: no    QCDR Measures:  ASA# 11 - Deborah-op Cardiac Arrest: ASA11B - Patient did NOT experience unanticipated cardiac arrest  ASA# 12 - Deborah-op Mortality Rate: ASA12B - Patient did NOT die  ASA# 13 - PACU Re-Intubation Rate: NA - No ETT / LMA used for case  ASA# 10 - Composite Anes Safety: ASA10A - No serious adverse event  ASA# 38 - New Corneal Injury: ASA38A - No new exposure keratitis or corneal abrasion in PACU    Additional Notes: no HA, no backache, + walk

## 2021-06-08 NOTE — ANESTHESIA PREPROCEDURE EVALUATION
Anesthesia Evaluation      Patient summary reviewed   No history of anesthetic complications     Airway   Mallampati: II   Pulmonary    (+) a smoker                         Cardiovascular - negative ROS     ROS comment: MTHFR     Neuro/Psych - negative ROS     Endo/Other    (+) pregnant     GI/Hepatic/Renal    (+) GERD,             Dental                         Anesthesia Plan  Planned anesthetic: epidural    ASA 2     Anesthetic plan and risks discussed with: patient and spouse    Post-op plan: routine recovery

## 2021-06-08 NOTE — PROGRESS NOTES
1107: SHANNON Perez updated that plan of care discussed with patient and patient would like to go home. See new order for discharge.

## 2021-06-08 NOTE — ANESTHESIA PROCEDURE NOTES
Epidural Block    Patient location during procedure: OB  Time Called: 1/27/2017 9:47 AM    Staffing:  Performing  Anesthesiologist: GEETHA ROBERSON  Preanesthetic Checklist  Completed: patient identified, risks, benefits, and alternatives discussed, timeout performed, consent obtained, at patient's request, airway assessed, oxygen available, suction available, emergency drugs available and hand hygiene performed  Procedure  Patient position: sitting  Prep: ChloraPrep  Patient monitoring: continuous pulse oximetry, heart rate and blood pressure  Approach: midline  Location: L3-L4  Injection technique: LOC saline  Number of Attempts:2  Needle  Needle type: Sage   Needle gauge: 18 G     Catheter in Space: 5  Assessment  Sensory level: T10  No complications

## 2021-06-15 PROBLEM — Z34.90 PREGNANT: Status: ACTIVE | Noted: 2017-01-27

## 2021-06-22 NOTE — PROGRESS NOTES
Elizabeth is having some contractions. She states that they feel like [place]. Similar to the ones she has at home in the evening. Up to the BR.

## 2021-06-22 NOTE — PROGRESS NOTES
Elizabeth denies feeling any further contractions. Dr. Sammie Bray updated on Elizabeth's status. Order for discharge received.

## 2021-06-22 NOTE — PROGRESS NOTES
Pt presents ambulatory accompanied by family. Pt states she slipped on the stairs and fell they are apprx 15 stairs which are inside. Pt states she has been feeling baby move, Pt denies any bleeding or leaking of fluid  Pt states no new contractions.  Pt states  She sore but not in tons of pain. Pt placed on monitor

## 2021-06-24 NOTE — ANESTHESIA PREPROCEDURE EVALUATION
Anesthesia Evaluation      Patient summary reviewed   No history of anesthetic complications     Airway   Mallampati: II   Pulmonary - negative ROS and normal exam                          Cardiovascular - negative ROS and normal exam  Rhythm: regular  Rate: normal,         Neuro/Psych - negative ROS     Endo/Other - negative ROS      GI/Hepatic/Renal - negative ROS           Dental - normal exam                        Anesthesia Plan  Planned anesthetic: general endotracheal  Defers scopolamine  Antiemetics  GAETT  Soft bite block    ASA 2   Induction: intravenous   Anesthetic plan and risks discussed with: patient    Post-op plan: routine recovery

## 2021-06-24 NOTE — PROGRESS NOTES
Updated Elizabeth Harrison CNM to unchanged SVE, category l tracing, contraction pattern, and patient comfort. Received orders for discharge to home with instructions for regular OB follow up care.

## 2021-06-24 NOTE — PROGRESS NOTES
Elizabeth is awake now. She states she is continuing to have contractions but they are much more mild and irregular. She is going to order supper.

## 2021-06-24 NOTE — PLAN OF CARE
Pt being evaluated for contractions since MN. Feels they're getting stronger and more painful. Cat 1 FHR and active fetal movement. Contractions palpate mild and pt talks though them while admission interview is in process.  Yane Hernandez updated of her status and order obtained for recheck in 2 hours, if no change ok to discharge.

## 2021-06-24 NOTE — ANESTHESIA CARE TRANSFER NOTE
Last vitals:   Vitals:    02/19/19 0931   BP: 123/63   Pulse: 96   Resp: 12   Temp: 36.7  C (98.1  F)   SpO2: 100%     Patient's level of consciousness is drowsy  Spontaneous respirations: yes  Maintains airway independently: yes  Dentition unchanged: yes  Oropharynx: oropharynx clear of all foreign objects    QCDR Measures:  ASA# 20 - Surgical Safety Checklist: WHO surgical safety checklist completed prior to induction    PQRS# 430 - Adult PONV Prevention: 4558F - Pt received => 2 anti-emetic agents (different classes) preop & intraop  ASA# 8 - Peds PONV Prevention: NA - Not pediatric patient, not GA or 2 or more risk factors NOT present  PQRS# 424 - Deborah-op Temp Management: 4559F - At least one body temp DOCUMENTED => 35.5C or 95.9F within required timeframe  PQRS# 426 - PACU Transfer Protocol: - Transfer of care checklist used  ASA# 14 - Acute Post-op Pain: ASA14B - Patient did NOT experience pain >= 7 out of 10

## 2021-06-24 NOTE — PROGRESS NOTES
Elizabeth fontanez nausea and states she was vomiting at home. Her contractions continue. I spoke with Dr. Bowie and then started an IV and medicated with Vistaril per orders. Category 1 tracing.

## 2021-06-24 NOTE — ANESTHESIA PROCEDURE NOTES
Epidural Block    Patient location during procedure: OB  Time Called: 2/18/2019 7:30 PM  Reason for Block:labor epidural  Staffing:  Performing  Anesthesiologist: Van Reyes MD  Preanesthetic Checklist  Completed: patient identified, risks, benefits, and alternatives discussed, timeout performed, consent obtained, at patient's request, airway assessed, oxygen available, suction available, emergency drugs available and hand hygiene performed  Procedure  Patient position: sitting  Prep: ChloraPrep and site prepped and draped  Patient monitoring: continuous pulse oximetry, heart rate and blood pressure  Approach: midline  Location: L3-L4  Injection technique: LOC saline  Number of Attempts:1  Needle  Needle type: Tuohy   Needle gauge: 17 G       Assessment  Sensory level:  No complications      Additional Notes:  LOC @ 4 cm, taped @ 9 cm

## 2021-06-24 NOTE — PROGRESS NOTES
Elizabeth had some improvement of discomfort with contractions after Terbutaline but that has worn off and contractions returned. Dr. Bowie notified. Will try medicating with Morphine fo rpain relief.

## 2021-06-24 NOTE — H&P
"Date: 2019  Time: 3:52 PM    Admission H&P  Elizabeth Kelly,  1985, MRN [id number]    PREGNANCY     PCP: Kira Ordonez APRN,ANTONI, [phone]   Code status:  Prior       Extended Emergency Contact Information  Primary Emergency Contact: Marcos Kelly  Address: [address] [place] [address] [place] [address] [facility]  Home Phone: [phone]  Relation: Spouse  Secondary Emergency Contact: North,April   [place]  Mobile Phone: [phone]  Relation: Friend       Impression:  Elizabeth Kelly is a 33 y.o. year old at 37w3d weeks in prodromal labor   Patient debi irregularly but painfully, states she can't go home with how painful these contractions are.     Plan:  Observation for now   Patient was 1 cm by RN exam during the night and 3 on recheck, however pt states she was told she was 3 cm in the office.  So it is unclear whether she has really made any significant cervical change.  Re-evaluate if does not go into active labor overnight, either augment labor in am or discharge to home.    Elizabeth Kelly,  1985, MRN [id number]        Chief Complaint:   Prodromal labor       OBSTETRICAL / DATING HISTORY:  Estimated Date of Delivery: Estimated Date of Delivery: 3/4/19  Gestational Age: 37w3d      Allergies   Allergen Reactions     Penicillins Rash      Medications Prior to Admission   Medication Sig Dispense Refill Last Dose     prenatal #115-iron-folic acid 29 mg iron- 1 mg Chew Chew 1 tablet daily.   2018 at Unknown time     sertraline (ZOLOFT) 50 MG tablet TAKE 1 TABLET BY MOUTH EVERY DAY  5 2018 at Unknown time           Physical Exam:  Temp:  [97.5  F (36.4  C)] 97.5  F (36.4  C)  Heart Rate:  [] 107  Resp:  [16-18] 16  BP: (119-134)/(78-79) 133/78    /78   Pulse (!) 107   Temp 97.5  F (36.4  C) (Oral)   Resp 16   Ht 5' 6\" (1.676 m)   Wt 165 lb (74.8 kg)   LMP 2018   SpO2 98%   BMI 26.63 kg/m  "     Head: Normocephalic, without obvious abnormality, atraumatic, Heart:  regular rate and rhythm, S1, S2 normal, no murmur, click, rub or gallop, Lungs:  clear to auscultation bilaterally and Extremities:  extremities normal, atraumatic, no cyanosis or edema      Membrane Status:       Fluid color/            Cervical Exam:  cm   %        Presentation: Vertex        Fetal Heart Rate:    Fetus A:  Baseline Changes: No Baseline Change  Variability: Moderate Variability 6-25bpm       Fetus B:             Fetus C:              Uterine Activity: Monitor Mode: External, Palpation  Contraction Frequency (min): 4-5  Contraction Duration (sec): 60-90     Resting Tone Palpated: Soft               Pertinent Labs  Admission on 02/14/2019   Component Date Value Ref Range Status     Group B strep External Result 02/07/2019 Negative   Final   Admission on 12/06/2018, Discharged on 12/06/2018   Component Date Value Ref Range Status     Antibody External Result 08/02/2018 no antibodies detected   Final     Hemoglobin External Result 08/02/2018 13.7   Final     Rubella External Result 08/02/2018 Immune   Final     HBsAg External Result 08/02/2018 Non-reactive   Final     HIV External Result 08/02/2018 Non-Reactive   Final     Platelet Count External Result 08/02/2018 391   Final     RPR External Result 08/02/2018 Non-Reactive   Final     Betke/Fetal Hgb Det 12/06/2018 0.0  <=0.1 % Final       Provider: Carolyn Bowie MD    Date: 2/14/2019  Time: 3:52 PM

## 2021-06-24 NOTE — ANESTHESIA PREPROCEDURE EVALUATION
Anesthesia Evaluation      Patient summary reviewed   No history of anesthetic complications     Airway   Mallampati: II  Neck ROM: full   Pulmonary - negative ROS and normal exam    breath sounds clear to auscultation                         Cardiovascular - negative ROS  Rhythm: regular  Rate: normal,         Neuro/Psych - negative ROS     Endo/Other    (+) pregnant ()     Comments: MTHFR    GI/Hepatic/Renal - negative ROS           Dental - normal exam                        Anesthesia Plan  Planned anesthetic: epidural  Patient requesting labor epidural. Patient interviewed and examined at the bedside with RN present throughout. Discussed with patient the procedure of epidural placement, expectations, and risks including but not limited to: decreased blood pressure, poor analgesia possibly requiring replacement, post-dural puncture headache, bleeding, infection, nerve damage, and high spinal block. All questions answered. Patient consents to proceed with epidural placement.      ASA 2     Anesthetic plan and risks discussed with: patient and spouse    Post-op plan: routine recovery

## 2021-06-24 NOTE — PLAN OF CARE
Patient's pain/discomfort is manageable Progressing        Pt with spaced contractions. MD updated and orders obtained

## 2021-06-24 NOTE — ANESTHESIA POSTPROCEDURE EVALUATION
Patient: Elizabeth Kelly  POST PARTUM TUBAL LIGATION - No Right Fallopian tube  Anesthesia type: general    Patient location: PACU  Last vitals:   Vitals:    02/19/19 1034   BP: 121/65   Pulse: 68   Resp: 16   Temp: 36.4  C (97.6  F)   SpO2: 100%     Post vital signs: stable  Level of consciousness: awake and responds to simple questions  Post-anesthesia pain: pain controlled  Post-anesthesia nausea and vomiting: no  Pulmonary: unassisted, return to baseline  Cardiovascular: stable and blood pressure at baseline  Hydration: adequate  Anesthetic events: no    QCDR Measures:  ASA# 11 - Deborah-op Cardiac Arrest: ASA11B - Patient did NOT experience unanticipated cardiac arrest  ASA# 12 - Deborah-op Mortality Rate: ASA12B - Patient did NOT die  ASA# 13 - PACU Re-Intubation Rate: ASA13B - Patient did NOT require a new airway mgmt  ASA# 10 - Composite Anes Safety: ASA10A - No serious adverse event    Additional Notes:

## 2021-06-24 NOTE — PROGRESS NOTES
Elizabeth continues to be uncomfortable with contractions and unable to rest/ Plan of care reviewed with Dr. Bowie. Orders received.

## 2021-06-24 NOTE — PROGRESS NOTES
Went over discharge instructions with patient. Went over increased signs of labor including increase in contractions, change in contractions, LOF, bleeding, feeling of pressure or changes in fetal movement. Discussed with her different positions and techniques to help with labor and what early labor is. All questions answered. She will discharge to home when her  arrives to the hospital.

## 2021-08-15 ENCOUNTER — HEALTH MAINTENANCE LETTER (OUTPATIENT)
Age: 36
End: 2021-08-15

## 2021-10-11 ENCOUNTER — HEALTH MAINTENANCE LETTER (OUTPATIENT)
Age: 36
End: 2021-10-11

## 2022-09-24 ENCOUNTER — HEALTH MAINTENANCE LETTER (OUTPATIENT)
Age: 37
End: 2022-09-24

## 2023-10-14 ENCOUNTER — HEALTH MAINTENANCE LETTER (OUTPATIENT)
Age: 38
End: 2023-10-14